# Patient Record
Sex: FEMALE | Race: WHITE | NOT HISPANIC OR LATINO | Employment: STUDENT | URBAN - METROPOLITAN AREA
[De-identification: names, ages, dates, MRNs, and addresses within clinical notes are randomized per-mention and may not be internally consistent; named-entity substitution may affect disease eponyms.]

---
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
## 2024-04-10 NOTE — PROGRESS NOTES
Assessment/Plan:       1. Irregular intermenstrual bleeding  Assessment & Plan:  Pt with 3 yr hx of mid cycle spotting that now is worse if she masturbates/climaxes.  Reassured that Ovulatory bleeding can occur and doesn't necessarily indicate a disorder or abnormality  Has never been sexually active and thus risk for STD as cause is unlikely.    Attempted exam but pt did not tolerate and exam aborted.   Given changes in bldg pattern w/ climax- will be more bldg than normal it is reasonable to check an US to r/o endometrial thickening or possible polyp  Reviewed that OCP given previously would likely resolve this bldg pattern but she is not willing to try. Doesn't want hormonal contraception  Will f/u with US results.     Orders:  -     US pelvis complete non OB; Future; Expected date: 04/12/2024            Subjective:      Patient ID: Bhavana King is a 18 y.o. female. She is here for problem visit    HPI  Here with her aunt today whom she lives w/ and is very supportive.  Pt c/o abnormal bldg.  Reporting midcycle bldg- typically is just light and spotting- more brown in color. Has been occurring since age 15. However if she masturbates (externally-not penetrative)  she will experience much more bleeding but still only  a day or 2.  She did previously discuss this and had an T/A US in march of last year which was WNL  She was ordered to start AV=P in September but she reports she did not start as she does not want to be on hormonal therapy    Has never been sexually active    Denies any abnormal vaginal d/c       Menstrual History:  Patient's last menstrual period was 03/24/2024 (exact date).        The following portions of the patient's history were reviewed and updated as appropriate: allergies, current medications, past family history, past medical history, past social history, past surgical history, and problem list.    Review of Systems  See HPI for pertinent positives          Objective:    /56 (BP  "Location: Left arm, Patient Position: Sitting, Cuff Size: Standard)   Ht 5' 4\" (1.626 m)   Wt 48.5 kg (107 lb)   LMP 03/24/2024 (Exact Date)   BMI 18.37 kg/m²      Physical Exam  Constitutional:       General: She is not in acute distress.     Appearance: Normal appearance.   Genitourinary:      Urethral meatus normal.      Genitourinary Comments: Did not tolerate pelvic exam  Pt crying with insertion of speculum- attempted bimanual with just one finger inserted and not tolerated.       Right Labia: No rash, lesions or skin changes.     Left Labia: No lesions, skin changes or rash.     Vaginal discharge (small amt d/c noted) present.      Pelvic exam was performed with patient in the lithotomy position.   Rectum:      No external hemorrhoid.   HENT:      Head: Normocephalic.   Cardiovascular:      Rate and Rhythm: Normal rate.   Pulmonary:      Effort: Pulmonary effort is normal.   Neurological:      Mental Status: She is alert and oriented to person, place, and time.   Psychiatric:         Mood and Affect: Mood normal.         Behavior: Behavior normal.   Vitals reviewed.             "

## 2024-04-12 ENCOUNTER — OFFICE VISIT (OUTPATIENT)
Dept: OBGYN CLINIC | Facility: MEDICAL CENTER | Age: 19
End: 2024-04-12
Payer: COMMERCIAL

## 2024-04-12 VITALS
DIASTOLIC BLOOD PRESSURE: 56 MMHG | WEIGHT: 107 LBS | BODY MASS INDEX: 18.27 KG/M2 | HEIGHT: 64 IN | SYSTOLIC BLOOD PRESSURE: 110 MMHG

## 2024-04-12 DIAGNOSIS — N92.1 IRREGULAR INTERMENSTRUAL BLEEDING: Primary | ICD-10-CM

## 2024-04-12 PROCEDURE — 99213 OFFICE O/P EST LOW 20 MIN: CPT | Performed by: CLINICAL NURSE SPECIALIST

## 2024-04-12 NOTE — ASSESSMENT & PLAN NOTE
Pt with 3 yr hx of mid cycle spotting that now is worse if she masturbates/climaxes.  Reassured that Ovulatory bleeding can occur and doesn't necessarily indicate a disorder or abnormality  Has never been sexually active and thus risk for STD as cause is unlikely.    Attempted exam but pt did not tolerate and exam aborted.   Given changes in bldg pattern w/ climax- will be more bldg than normal it is reasonable to check an US to r/o endometrial thickening or possible polyp  Reviewed that OCP given previously would likely resolve this bldg pattern but she is not willing to try. Doesn't want hormonal contraception  Will f/u with US results.

## 2024-07-26 ENCOUNTER — TELEPHONE (OUTPATIENT)
Age: 19
End: 2024-07-26

## 2024-07-26 NOTE — TELEPHONE ENCOUNTER
Pt's aunt called to verify her US order was in to sched appt transferred to central sched to sched.

## 2024-11-27 ENCOUNTER — OFFICE VISIT (OUTPATIENT)
Dept: FAMILY MEDICINE CLINIC | Facility: CLINIC | Age: 19
End: 2024-11-27
Payer: COMMERCIAL

## 2024-11-27 VITALS
SYSTOLIC BLOOD PRESSURE: 118 MMHG | OXYGEN SATURATION: 99 % | TEMPERATURE: 98 F | HEART RATE: 100 BPM | HEIGHT: 64 IN | DIASTOLIC BLOOD PRESSURE: 80 MMHG | BODY MASS INDEX: 18.1 KG/M2 | WEIGHT: 106 LBS

## 2024-11-27 DIAGNOSIS — R53.83 OTHER FATIGUE: ICD-10-CM

## 2024-11-27 DIAGNOSIS — Z00.00 ROUTINE HEALTH MAINTENANCE: ICD-10-CM

## 2024-11-27 DIAGNOSIS — R09.82 POST-NASAL DRIP: Primary | ICD-10-CM

## 2024-11-27 PROCEDURE — 99385 PREV VISIT NEW AGE 18-39: CPT

## 2024-11-27 PROCEDURE — 99213 OFFICE O/P EST LOW 20 MIN: CPT

## 2024-11-27 RX ORDER — FLUTICASONE PROPIONATE 50 MCG
2 SPRAY, SUSPENSION (ML) NASAL DAILY
Qty: 16 G | Refills: 1 | Status: SHIPPED | OUTPATIENT
Start: 2024-11-27 | End: 2024-12-27

## 2024-11-27 RX ORDER — CETIRIZINE HYDROCHLORIDE 10 MG/1
10 TABLET ORAL DAILY
Qty: 30 TABLET | Refills: 2 | Status: SHIPPED | OUTPATIENT
Start: 2024-11-27

## 2024-11-27 NOTE — PROGRESS NOTES
Adult Annual Physical  Name: Bhavana King      : 2005      MRN: [id number]  Encounter Provider: Ryan Storey PA-C  Encounter Date: 2024   Encounter department: [facility]    Assessment & Plan  Post-nasal drip  Present for 2 years  States congestion builds up and drips through back of throat   Pt states this happens every day, does not identify inciting facotsd   Denies associated Sx of fever  She has never c/o of this before but it has been present for years  Exam is unrevealing, no signs of infection or erythema to pharynx   Suspect allergic component at this time will Rx supportive care  F/u 1 month for this  Orders:    fluticasone (FLONASE) 50 mcg/act nasal spray; 2 sprays into each nostril daily 2 sprays in each nostril once daily as needed, if control of symptoms is established, consider reducing to 1 spray    cetirizine (ZyrTEC) 10 mg tablet; Take 1 tablet (10 mg total) by mouth daily    Other fatigue  Pt also notes fatigue, states most days and does not attribute this to a specific event or thing  When asked, she also notes a history of mild hair loss in the past  She also notes that a family member in the past had told her that she had an issue absorbing vitamin B12  Will evaluate with baseline lab workup and f/u 1 month  Orders:    TSH, 3rd generation with Free T4 reflex; Future    Vitamin D 25 hydroxy; Future    Vitamin B12; Future    Routine health maintenance    Orders:    CBC and differential; Future    Comprehensive metabolic panel; Future    Lipid panel; Future    Immunizations and preventive care screenings were discussed with patient today. Appropriate education was printed on patient's after visit summary.    Counseling:  Dental Health: discussed importance of regular tooth brushing, flossing, and dental visits.  Exercise: the importance of regular exercise/physical activity was discussed. Recommend exercise 3-5 times per week for at least 30 minutes.           History of Present Illness     Adult Annual Physical:  Patient presents for annual physical. Bhavana King is a 19 y.o. female  presenting for PND and to establish care. She has moved from [place] recently. She does follow with OBGYN.     vaccines, routine labs, reviewed at this visit. She denies covid and flu. .     Diet and Physical Activity:  - Diet/Nutrition: well balanced diet. gluten free since family in house are all gluten free  - Exercise: no formal exercise and walking.    Depression Screening:  - PHQ-2 Score: 0    General Health:  - Sleep: sleeps well.  - Hearing: normal hearing right ear and normal hearing left ear.  - Vision: no vision problems.  - Dental: regular dental visits.    /GYN Health:  - Follows with GYN: yes.     Review of Systems   Constitutional:  Negative for chills and fever.   HENT:  Positive for postnasal drip. Negative for ear pain, mouth sores, nosebleeds, rhinorrhea, sinus pressure, sinus pain, sneezing, sore throat and tinnitus.    Eyes:  Negative for pain and visual disturbance.   Respiratory:  Negative for cough and shortness of breath.    Cardiovascular:  Negative for chest pain and palpitations.   Gastrointestinal:  Negative for abdominal pain and vomiting.   Genitourinary:  Negative for dysuria and hematuria.   Musculoskeletal:  Negative for arthralgias and back pain.   Skin:  Negative for color change and rash.   Neurological:  Negative for seizures and syncope.   All other systems reviewed and are negative.    Medical History Reviewed by provider this encounter:  Tobacco  Allergies  Meds  Problems  Med Hx  Surg Hx  Fam Hx     .  Past Medical History   Past Medical History:   Diagnosis Date    MTHFR (methylene THF reductase) deficiency and homocystinuria (HCC)     OCD (obsessive compulsive disorder)      Past Surgical History:   Procedure Laterality Date    INGUINAL HERNIA REPAIR      MO MARSUPIALIZATION BARTHOLINS GLAND CYST Left 7/24/2023    Procedure: LEFT  "MARSUPILIZATION BARTHOLIN CYST EXAM UNDER ANESTHESIA;  Surgeon: India Wiggins MD;  Location: [facility];  Service: Gynecology     History reviewed. No pertinent family history.   reports that she has quit smoking. Her smoking use included cigarettes. She has never used smokeless tobacco. She reports current alcohol use. She reports that she does not currently use drugs after having used the following drugs: Marijuana.  No current outpatient medications on file prior to visit.     No current facility-administered medications on file prior to visit.   No Known Allergies   No current outpatient medications on file prior to visit.     No current facility-administered medications on file prior to visit.      Social History     Tobacco Use    Smoking status: Former     Types: Cigarettes    Smokeless tobacco: Never   Vaping Use    Vaping status: Former   Substance and Sexual Activity    Alcohol use: Yes     Comment: once a week    Drug use: Not Currently     Types: Marijuana    Sexual activity: Never     Birth control/protection: None       Objective   /80 (BP Location: Left arm, Patient Position: Sitting, Cuff Size: Large)   Pulse 100   Temp 98 °F (36.7 °C)   Ht 5' 4\" (1.626 m)   Wt 48.1 kg (106 lb)   SpO2 99%   BMI 18.19 kg/m²     Physical Exam  Vitals and nursing note reviewed.   Constitutional:       General: She is not in acute distress.     Appearance: She is well-developed.   HENT:      Head: Normocephalic and atraumatic.      Right Ear: Tympanic membrane normal.      Left Ear: Tympanic membrane normal.      Nose: No congestion or rhinorrhea.      Mouth/Throat:      Pharynx: No oropharyngeal exudate or posterior oropharyngeal erythema.   Eyes:      Conjunctiva/sclera: Conjunctivae normal.   Cardiovascular:      Rate and Rhythm: Normal rate and regular rhythm.      Heart sounds: No murmur heard.  Pulmonary:      Effort: Pulmonary effort is normal. No respiratory distress.      Breath sounds: " Normal breath sounds.   Abdominal:      Palpations: Abdomen is soft.      Tenderness: There is no abdominal tenderness.   Musculoskeletal:         General: No swelling.      Cervical back: Neck supple.   Skin:     General: Skin is warm and dry.      Capillary Refill: Capillary refill takes less than 2 seconds.   Neurological:      Mental Status: She is alert and oriented to person, place, and time.   Psychiatric:         Mood and Affect: Mood normal.

## 2025-05-20 ENCOUNTER — VBI (OUTPATIENT)
Dept: ADMINISTRATIVE | Facility: OTHER | Age: 20
End: 2025-05-20

## 2025-05-20 NOTE — TELEPHONE ENCOUNTER
05/20/25 9:51 AM     Chart reviewed for [facility] Visits was/were not submitted to the patient's insurance.     Lisset Childers MA   [facility]

## (undated) DEVICE — INTENDED FOR TISSUE SEPARATION, AND OTHER PROCEDURES THAT REQUIRE A SHARP SURGICAL BLADE TO PUNCTURE OR CUT.: Brand: BARD-PARKER SAFETY BLADES SIZE 15, STERILE

## (undated) DEVICE — GLOVE SRG BIOGEL 6.5

## (undated) DEVICE — SUT VICRYL 3-0 SH 27 IN J416H

## (undated) DEVICE — GLOVE INDICATOR PI UNDERGLOVE SZ 7 BLUE

## (undated) DEVICE — BETHLEHEM UNIVERSAL MINOR VAG: Brand: CARDINAL HEALTH

## (undated) DEVICE — NEEDLE 25GA X 1 IN SAFETY GLIDE